# Patient Record
Sex: FEMALE | Race: WHITE | Employment: OTHER | URBAN - METROPOLITAN AREA
[De-identification: names, ages, dates, MRNs, and addresses within clinical notes are randomized per-mention and may not be internally consistent; named-entity substitution may affect disease eponyms.]

---

## 2023-01-07 ENCOUNTER — APPOINTMENT (OUTPATIENT)
Dept: GENERAL RADIOLOGY | Age: 73
End: 2023-01-07
Attending: EMERGENCY MEDICINE
Payer: MEDICARE

## 2023-01-07 ENCOUNTER — HOSPITAL ENCOUNTER (EMERGENCY)
Age: 73
Discharge: HOME | End: 2023-01-07
Attending: EMERGENCY MEDICINE
Payer: MEDICARE

## 2023-01-07 VITALS
OXYGEN SATURATION: 97 % | DIASTOLIC BLOOD PRESSURE: 72 MMHG | WEIGHT: 111 LBS | HEART RATE: 88 BPM | HEIGHT: 59 IN | SYSTOLIC BLOOD PRESSURE: 139 MMHG | TEMPERATURE: 97.9 F | RESPIRATION RATE: 18 BRPM | BODY MASS INDEX: 22.38 KG/M2

## 2023-01-07 DIAGNOSIS — S22.32XA CLOSED FRACTURE OF ONE RIB OF LEFT SIDE, INITIAL ENCOUNTER: Primary | ICD-10-CM

## 2023-01-07 DIAGNOSIS — S49.92XA SHOULDER INJURY, LEFT, INITIAL ENCOUNTER: ICD-10-CM

## 2023-01-07 PROCEDURE — 73030 X-RAY EXAM OF SHOULDER: CPT

## 2023-01-07 PROCEDURE — 74011000250 HC RX REV CODE- 250

## 2023-01-07 PROCEDURE — 99283 EMERGENCY DEPT VISIT LOW MDM: CPT

## 2023-01-07 PROCEDURE — 74011250637 HC RX REV CODE- 250/637

## 2023-01-07 PROCEDURE — 71111 X-RAY EXAM RIBS/CHEST4/> VWS: CPT

## 2023-01-07 RX ORDER — ACETAMINOPHEN 500 MG
1000 TABLET ORAL
Status: COMPLETED | OUTPATIENT
Start: 2023-01-07 | End: 2023-01-07

## 2023-01-07 RX ORDER — IBUPROFEN 600 MG/1
600 TABLET ORAL
Qty: 20 TABLET | Refills: 0 | Status: SHIPPED | OUTPATIENT
Start: 2023-01-07

## 2023-01-07 RX ORDER — LIDOCAINE 4 G/100G
1 PATCH TOPICAL
Status: DISCONTINUED | OUTPATIENT
Start: 2023-01-07 | End: 2023-01-07 | Stop reason: HOSPADM

## 2023-01-07 RX ORDER — ACETAMINOPHEN 325 MG/1
650 TABLET ORAL
Qty: 20 TABLET | Refills: 0 | Status: SHIPPED | OUTPATIENT
Start: 2023-01-07

## 2023-01-07 RX ORDER — LIDOCAINE 4 G/100G
PATCH TOPICAL
Qty: 15 EACH | Refills: 0 | Status: SHIPPED | OUTPATIENT
Start: 2023-01-07

## 2023-01-07 RX ORDER — HYDROCODONE BITARTRATE AND ACETAMINOPHEN 5; 325 MG/1; MG/1
1 TABLET ORAL
Qty: 5 TABLET | Refills: 0 | Status: SHIPPED | OUTPATIENT
Start: 2023-01-07 | End: 2023-01-10

## 2023-01-07 RX ADMIN — ACETAMINOPHEN 1000 MG: 500 TABLET ORAL at 11:57

## 2023-01-07 NOTE — ED TRIAGE NOTES
Pt tripped and fell, Ronnielesvia Flaquita on her Left side. C/o Left shoulder pain and left rib pain. Given 15 mg IV Toradol PTA.  Denies head pain or LOC

## 2023-01-07 NOTE — ED PROVIDER NOTES
EMERGENCY DEPARTMENT HISTORY AND PHYSICAL EXAM      Date: 1/7/2023  Patient Name: Tyler Alonzo    History of Presenting Illness     Chief Complaint   Patient presents with    Fall       History Provided By: Patient    HPI: Tyler Alonzo, 74 y.o. female with past medical history of osteoporosis, diabetes type 2, osteopenia, hypertension, hyperlipidemia, presents emergency department via EMS accompanied by one of her friends, with complaints of left shoulder left-sided rib pain s/p mechanical ground-level fall just prior to arrival.  Reports walking and tripping over drawer which caused her to fall forward with an outstretched left arm. Patient states her left shoulder was dislocated but she reduced it herself when she was getting up. 15 milligrams of IV Toradol given by EMS PTA, with some relief. Denies head strike/LOC, HA, chest pain, dizziness, difficulty breathing, abdominal pain, nausea/vomiting, back pain, hip pain. Upon arrival to the ED pt is alert and oriented x 3, well-appearing, and interacting appropriately; no obvious distress noted. There are no other complaints, changes, or physical findings at this time. Patient presents with  Past History     Past Medical History:  No past medical history on file. Past Surgical History:  No past surgical history on file. Family History:  No family history on file. Social History: Allergies:  No Known Allergies    PCP: Tasha Denney MD    No current facility-administered medications on file prior to encounter. No current outpatient medications on file prior to encounter. Review of Systems   Review of Systems   Constitutional:  Negative for chills, fatigue and fever. Respiratory:  Negative for cough, chest tightness and shortness of breath. Cardiovascular:  Negative for chest pain, palpitations and leg swelling. Gastrointestinal:  Negative for abdominal distention, abdominal pain, diarrhea, nausea and vomiting. Musculoskeletal:  Negative for back pain, neck pain and neck stiffness. Denies hip pain, bilateral lower extremity pain, right arm pain. Complains of pain to left shoulder with movement and with palpation to the area. Skin:  Negative for wound. Neurological:  Negative for dizziness, syncope, weakness, light-headedness and headaches. Denies head strike/LOC     Physical Exam   Physical Exam  Constitutional:       General: She is not in acute distress. Appearance: Normal appearance. She is normal weight. She is not ill-appearing, toxic-appearing or diaphoretic. HENT:      Head: Normocephalic and atraumatic. Eyes:      Extraocular Movements: Extraocular movements intact. Pupils: Pupils are equal, round, and reactive to light. Cardiovascular:      Rate and Rhythm: Normal rate and regular rhythm. Pulses: Normal pulses. Heart sounds: Normal heart sounds. Pulmonary:      Effort: Pulmonary effort is normal. No respiratory distress. Breath sounds: Normal breath sounds. Chest:      Chest wall: No tenderness. Abdominal:      General: Abdomen is flat. Bowel sounds are normal. There is distension. Palpations: Abdomen is soft. Tenderness: There is no abdominal tenderness. Musculoskeletal:         General: Tenderness present. No swelling, deformity or signs of injury. Cervical back: Normal range of motion and neck supple. No rigidity. Right lower leg: No edema. Left lower leg: No edema. Comments: Left shoulder pain, worse with palpation and movement of extremity. Decreased ROM due to pain. No bony deformity. Neurovascularly intact. No bony deformity, bruising, or any other obvious signs of trauma    Pain to left rib cage, but unable to reproduce with palpation patient states. Patient states pain is reproduced with movement of her upper body, denies difficulty breathing. No bony deformity, bruising, or any other obvious signs of trauma. Neurological:      General: No focal deficit present. Mental Status: She is alert and oriented to person, place, and time. Sensory: No sensory deficit. Motor: No weakness. Comments: Bilateral upper extremity strength is equal 5/5. Bilateral lower extremity strength is equal 5/5. Neurovascularly intact. Psychiatric:         Mood and Affect: Mood normal.         Behavior: Behavior normal.         Thought Content: Thought content normal.         Judgment: Judgment normal.       Lab and Diagnostic Study Results   Labs -   No results found for this or any previous visit (from the past 12 hour(s)). Radiologic Studies -   @lastxrresult@  CT Results  (Last 48 hours)      None          CXR Results  (Last 48 hours)                 01/07/23 1239  XR RIBS BI W PA CHEST 4 VS Final result    Impression:      1. Mildly displaced lateral left sixth rib fracture. 2. No pneumothorax. 3. Normal left glenohumeral joint. No shoulder fracture. Moderate left   acromioclavicular osteoarthritis. Narrative:  EXAM: XR RIBS BI W PA CHEST 4 VS, XR SHOULDER LT AP/LAT MIN 2 V       INDICATION: Left chest wall pain and left shoulder pain after fall onto left   side       COMPARISON: Portable chest on 7/6/2013. TECHNIQUE: 5 images of AP pelvis and 3 views left RIBS; 3 views left shoulder (2   separate studies reported together)       FINDINGS: Mildly displaced fracture of the lateral aspect of the left rib. Mild   osteopenia. No other fracture. Lungs are clear. Cardiac silhouette is within   normal limits. Aortic arch is atherosclerotic. Glenohumeral joint is within   normal limits. Moderate acromioclavicular osteoarthritis. Cortical irregularity   of the greater tuberosity of the humerus is chronic.                    Medical Decision Making and ED Course   Differential Diagnosis & Medical Decision Making Provider Note:   Patient presents with left shoulder pain left-sided rib pain s/p mechanical ground-level fall. Will order x-rays of chest, left rib cage, left shoulder to evaluate for fracture, dislocation, PTX. Pt received IV Toradol 15 mg by EMS, with some relief. Will order lidocaine patch and Tylenol and will order something stronger if needed, per shared decision making with the pt. - I am the first provider for this patient. I reviewed the vital signs, available nursing notes, past medical history, past surgical history, family history and social history. The patients presenting problems have been discussed, and they are in agreement with the care plan formulated and outlined with them. I have encouraged them to ask questions as they arise throughout their visit. Vital Signs-Reviewed the patient's vital signs. Patient Vitals for the past 12 hrs:   Temp Pulse Resp BP SpO2   01/07/23 1111 97.9 °F (36.6 °C) 88 18 139/72 97 %       ED Course:   ED Course as of 01/07/23 1338   Sat Jan 07, 2023   1323 XR RIBS BI W PA CHEST 4 VS  Discussed findings with patient, all questions answered. Advised PCP follow-up. Will provide incentive spirometer, patient is already educated on how to use this as she has used it several times in the past.  We will send prescriptions for pain medication. [LM]      ED Course User Index  [LM] Jason Quintanilla NP   Initial assessment performed. The patients presenting problems have been discussed, and they are in agreement with the care plan formulated and outlined with them. I have encouraged them to ask questions as they arise throughout their visit. Procedures   Performed by: Shrada Hassan NP  Procedures      Disposition   Disposition: DC- Adult Discharges: All of the diagnostic tests were reviewed and questions answered. Diagnosis, care plan and treatment options were discussed. The patient understands the instructions and will follow up as directed. The patients results have been reviewed with them. They have been counseled regarding their diagnosis.   The patient and friend verbally convey understanding and agreement of the signs, symptoms, diagnosis, treatment and prognosis and additionally agrees to follow up as recommended with their PCP in 24 - 48 hours. They also agree with the care-plan and convey that all of their questions have been answered. I have also put together some discharge instructions for them that include: 1) educational information regarding their diagnosis, 2) how to care for their diagnosis at home, as well a 3) list of reasons why they would want to return to the ED prior to their follow-up appointment, should their condition change. DISCHARGE PLAN:  1. There are no discharge medications for this patient. 2.   Follow-up Information       Follow up With Specialties Details Why Contact Jasmin Durant MD Internal Medicine Physician Schedule an appointment as soon as possible for a visit in 1 week Reevaluation [place] [id number]  [phone]            3. Return to ED if worse   4. Current Discharge Medication List        START taking these medications    Details   HYDROcodone-acetaminophen (Norco) 5-325 mg per tablet Take 1 Tablet by mouth every six (6) hours as needed (Severe pain) for up to 3 days. Max Daily Amount: 2 Tablets. Qty: 5 Tablet, Refills: 0  Start date: 1/7/2023, End date: 1/10/2023    Associated Diagnoses: Closed fracture of one rib of left side, initial encounter      lidocaine 4 % patch Apply for 12 hours then remove for 12 hours. As needed for rib pain. Qty: 15 Each, Refills: 0  Start date: 1/7/2023      ibuprofen (MOTRIN) 600 mg tablet Take 1 Tablet by mouth every six (6) hours as needed for Pain. Qty: 20 Tablet, Refills: 0  Start date: 1/7/2023      acetaminophen (TYLENOL) 325 mg tablet Take 2 Tablets by mouth every four (4) hours as needed for Pain.   Qty: 20 Tablet, Refills: 0  Start date: 1/7/2023               Diagnosis/Clinical Impression     Clinical Impression: 1. Closed fracture of one rib of left side, initial encounter    2. Shoulder injury, left, initial encounter        Attestations: Nessa SOLO NP, am the primary clinician of record. Please note that this dictation was completed with [employer], the [employer] voice recognition software. Quite often unanticipated grammatical, syntax, homophones, and other interpretive errors are inadvertently transcribed by the computer software. Please disregard these errors. Please excuse any errors that have escaped final proofreading. Thank you.

## 2023-01-07 NOTE — DISCHARGE INSTRUCTIONS
Please return to emergency department if you develop chest pain, difficulty breathing, increased left rib pain, feeling faint, or for any other symptoms that are concerning to you. It is important that you use your incentive spirometer several times a day to help prevent pneumonia. Thank you! Thank you for allowing me to care for you in the emergency department. It is my goal to provide you with excellent care. If you have not received excellent quality care, please ask to speak to the nurse manager. Please fill out the survey that will come to you by mail or email since we listen to your feedback! Below you will find a list of your tests from today's visit. Should you have any questions, please do not hesitate to call the emergency department. Labs  No results found for this or any previous visit (from the past 12 hour(s)). Radiologic Studies  XR SHOULDER LT AP/LAT MIN 2 V   Final Result      1. Mildly displaced lateral left sixth rib fracture. 2. No pneumothorax. 3. Normal left glenohumeral joint. No shoulder fracture. Moderate left   acromioclavicular osteoarthritis. XR RIBS BI W PA CHEST 4 VS   Final Result      1. Mildly displaced lateral left sixth rib fracture. 2. No pneumothorax. 3. Normal left glenohumeral joint. No shoulder fracture. Moderate left   acromioclavicular osteoarthritis. CT Results  (Last 48 hours)      None          CXR Results  (Last 48 hours)                 01/07/23 1239  XR RIBS BI W PA CHEST 4 VS Final result    Impression:      1. Mildly displaced lateral left sixth rib fracture. 2. No pneumothorax. 3. Normal left glenohumeral joint. No shoulder fracture. Moderate left   acromioclavicular osteoarthritis. Narrative:  EXAM: XR RIBS BI W PA CHEST 4 VS, XR SHOULDER LT AP/LAT MIN 2 V       INDICATION: Left chest wall pain and left shoulder pain after fall onto left   side       COMPARISON: Portable chest on 7/6/2013. TECHNIQUE: 5 images of AP pelvis and 3 views left RIBS; 3 views left shoulder (2   separate studies reported together)       FINDINGS: Mildly displaced fracture of the lateral aspect of the left rib. Mild   osteopenia. No other fracture. Lungs are clear. Cardiac silhouette is within   normal limits. Aortic arch is atherosclerotic. Glenohumeral joint is within   normal limits. Moderate acromioclavicular osteoarthritis. Cortical irregularity   of the greater tuberosity of the humerus is chronic.                 ------------------------------------------------------------------------------------------------------------  The exam and treatment you received in the Emergency Department were for an urgent problem and are not intended as complete care. It is important that you follow-up with a doctor, nurse practitioner, or physician assistant to:  (1) confirm your diagnosis,  (2) re-evaluation of changes in your illness and treatment, and  (3) for ongoing care. Please take your discharge instructions with you when you go to your follow-up appointment. If you have any problem arranging a follow-up appointment, contact the Emergency Department. If your symptoms become worse or you do not improve as expected and you are unable to reach your health care provider, please return to the Emergency Department. We are available 24 hours a day. If a prescription has been provided, please have it filled as soon as possible to prevent a delay in treatment. If you have any questions or reservations about taking the medication due to side effects or interactions with other medications, please call your primary care provider or contact the ER.